# Patient Record
Sex: FEMALE | Race: WHITE | Employment: UNEMPLOYED | URBAN - METROPOLITAN AREA
[De-identification: names, ages, dates, MRNs, and addresses within clinical notes are randomized per-mention and may not be internally consistent; named-entity substitution may affect disease eponyms.]

---

## 2020-01-19 ENCOUNTER — TELEPHONE (OUTPATIENT)
Dept: OBGYN UNIT | Facility: HOSPITAL | Age: 35
End: 2020-01-19

## 2020-01-19 DIAGNOSIS — L29.9 ITCHING: Primary | ICD-10-CM

## 2020-01-19 NOTE — TELEPHONE ENCOUNTER
Patient called complaining of an itchy hand and an itchy foot. She is worried about cholestasis because she had it last pregnancy at 28 weeks and it was really bad at that time. Discussed with patient that this is unlikely but could be early warning sign.

## 2020-01-20 ENCOUNTER — TELEPHONE (OUTPATIENT)
Dept: PERINATAL CARE | Facility: HOSPITAL | Age: 35
End: 2020-01-20

## 2020-01-28 NOTE — PROGRESS NOTES
Soh[facility] Cummings Consultation    Dear Dr. Yessy Callaway,    Thank you for requesting ultrasound evaluation and maternal fetal medicine consultation on your patient Lavern Morillo.   As you are aware she is a 29year old female with a Singlet Rfl:   Allergies: No Known Allergies      PHYSICAL EXAMINATION:  /72   Pulse 91   Ht 4' 10\" (1.473 m)   Wt 148 lb (67.1 kg)   LMP 08/18/2019 (Exact Date)   BMI 30.93 kg/m²   General: alert and oriented,no acute distress  Abdomen: gravid, soft, non-t plexus, Cisterna Magna, midline falx, cerebellum, cerebellar lobes, posterior fossa, vermis, cavum septi pellucidi.   Heart: Visualized and normal appearance: cardiac location, four-chamber view, left outflow tract, right outflow tract, great vessels, Ducta Preeclampsia   * Hydramnios   * Fetal macrosomia   * Fetal organomegaly (hepatomegaly, cardiomegaly)   * Birth trauma   * Operative delivery   *  mortality   *  respiratory problems and metabolic complications   The spontaneous  ra

## 2020-02-04 ENCOUNTER — OFFICE VISIT (OUTPATIENT)
Dept: PERINATAL CARE | Facility: HOSPITAL | Age: 35
End: 2020-02-04
Attending: OBSTETRICS & GYNECOLOGY
Payer: COMMERCIAL

## 2020-02-04 VITALS
WEIGHT: 148 LBS | BODY MASS INDEX: 31.07 KG/M2 | SYSTOLIC BLOOD PRESSURE: 115 MMHG | DIASTOLIC BLOOD PRESSURE: 72 MMHG | HEART RATE: 91 BPM | HEIGHT: 58 IN

## 2020-02-04 DIAGNOSIS — Z36.9 ENCOUNTER FOR ANTENATAL SCREENING OF MOTHER: ICD-10-CM

## 2020-02-04 DIAGNOSIS — O24.410 DIET CONTROLLED GESTATIONAL DIABETES MELLITUS (GDM), ANTEPARTUM: ICD-10-CM

## 2020-02-04 DIAGNOSIS — Z87.42 HISTORY OF PCOS: ICD-10-CM

## 2020-02-04 PROBLEM — Z34.92 SECOND TRIMESTER PREGNANCY: Status: ACTIVE | Noted: 2020-02-04

## 2020-02-04 PROBLEM — Z34.92 SECOND TRIMESTER PREGNANCY (HCC): Status: ACTIVE | Noted: 2020-02-04

## 2020-02-04 PROBLEM — E03.9 HYPOTHYROIDISM: Status: ACTIVE | Noted: 2020-02-04

## 2020-02-04 PROCEDURE — 99243 OFF/OP CNSLTJ NEW/EST LOW 30: CPT | Performed by: OBSTETRICS & GYNECOLOGY

## 2020-02-04 PROCEDURE — 76811 OB US DETAILED SNGL FETUS: CPT | Performed by: OBSTETRICS & GYNECOLOGY

## 2020-03-05 NOTE — PROGRESS NOTES
Venecia Edgar Consultation    Dear Dr. Glo Butts    Thank you for requesting ultrasound evaluation and maternal fetal medicine consultation on your patient Tammy Maddox.   As you are aware she is a 29year old female  with a General: alert and oriented,no acute distress  Abdomen: gravid, soft, non-tender  Extremities: non-tender, no edema    OBSTETRIC ULTRASOUND  The patient had a follow-up growth ultrasound today which revealed normal interval fetal growth.  _______________ discussed the increased incidence of macrosomia and the potential for related birth injury to her and her baby.  We talked about the increase risks associated risk of fetal hyperinsulinemia, jaundice, electrolyte imbalance, seizure activity, IUFD and other advised for medication controlled diabetes    Thank you for allowing me to participate in the care of your patient. Please do not hesitate to contact me if additional questions or concerns arise. Collin Cespedes. Paola Gallegos M.D.     The majority of the time (>50

## 2020-03-06 ENCOUNTER — OFFICE VISIT (OUTPATIENT)
Dept: PERINATAL CARE | Facility: HOSPITAL | Age: 35
End: 2020-03-06
Attending: OBSTETRICS & GYNECOLOGY
Payer: COMMERCIAL

## 2020-03-06 VITALS
WEIGHT: 147 LBS | DIASTOLIC BLOOD PRESSURE: 77 MMHG | BODY MASS INDEX: 31 KG/M2 | HEART RATE: 92 BPM | SYSTOLIC BLOOD PRESSURE: 127 MMHG

## 2020-03-06 DIAGNOSIS — O24.419 GDM, CLASS A2: Primary | ICD-10-CM

## 2020-03-06 DIAGNOSIS — O24.414 INSULIN CONTROLLED GESTATIONAL DIABETES MELLITUS (GDM) IN SECOND TRIMESTER: Primary | ICD-10-CM

## 2020-03-06 PROCEDURE — 99242 OFF/OP CONSLTJ NEW/EST SF 20: CPT | Performed by: OBSTETRICS & GYNECOLOGY

## 2020-03-06 PROCEDURE — 76816 OB US FOLLOW-UP PER FETUS: CPT | Performed by: OBSTETRICS & GYNECOLOGY

## 2020-03-16 DIAGNOSIS — O24.419 GDM, CLASS A2: ICD-10-CM

## 2020-03-23 DIAGNOSIS — O24.419 GDM, CLASS A2: ICD-10-CM

## 2020-04-20 DIAGNOSIS — O24.419 GDM, CLASS A2: ICD-10-CM

## 2020-04-21 DIAGNOSIS — O24.419 GDM, CLASS A2: ICD-10-CM

## 2020-05-07 ENCOUNTER — TELEPHONE (OUTPATIENT)
Dept: OBGYN UNIT | Facility: HOSPITAL | Age: 35
End: 2020-05-07

## 2020-05-11 ENCOUNTER — HOSPITAL ENCOUNTER (INPATIENT)
Facility: HOSPITAL | Age: 35
LOS: 1 days | Discharge: HOME | End: 2020-05-12
Attending: OBSTETRICS & GYNECOLOGY | Admitting: OBSTETRICS & GYNECOLOGY
Payer: COMMERCIAL

## 2020-05-11 ENCOUNTER — ANESTHESIA EVENT (OUTPATIENT)
Dept: OBGYN UNIT | Facility: HOSPITAL | Age: 35
End: 2020-05-11
Payer: COMMERCIAL

## 2020-05-11 ENCOUNTER — ANESTHESIA (OUTPATIENT)
Dept: OBGYN UNIT | Facility: HOSPITAL | Age: 35
End: 2020-05-11
Payer: COMMERCIAL

## 2020-05-11 ENCOUNTER — APPOINTMENT (OUTPATIENT)
Dept: OBGYN CLINIC | Facility: HOSPITAL | Age: 35
End: 2020-05-11
Payer: COMMERCIAL

## 2020-05-11 PROBLEM — Z34.90 PREGNANCY: Status: ACTIVE | Noted: 2020-05-11

## 2020-05-11 PROBLEM — Z34.90 PREGNANCY (HCC): Status: ACTIVE | Noted: 2020-05-11

## 2020-05-11 PROCEDURE — 86850 RBC ANTIBODY SCREEN: CPT | Performed by: OBSTETRICS & GYNECOLOGY

## 2020-05-11 PROCEDURE — 3E033VJ INTRODUCTION OF OTHER HORMONE INTO PERIPHERAL VEIN, PERCUTANEOUS APPROACH: ICD-10-PCS | Performed by: OBSTETRICS & GYNECOLOGY

## 2020-05-11 PROCEDURE — 85025 COMPLETE CBC W/AUTO DIFF WBC: CPT | Performed by: OBSTETRICS & GYNECOLOGY

## 2020-05-11 PROCEDURE — 82962 GLUCOSE BLOOD TEST: CPT

## 2020-05-11 PROCEDURE — 86780 TREPONEMA PALLIDUM: CPT | Performed by: OBSTETRICS & GYNECOLOGY

## 2020-05-11 PROCEDURE — 86901 BLOOD TYPING SEROLOGIC RH(D): CPT | Performed by: OBSTETRICS & GYNECOLOGY

## 2020-05-11 PROCEDURE — 86900 BLOOD TYPING SEROLOGIC ABO: CPT | Performed by: OBSTETRICS & GYNECOLOGY

## 2020-05-11 PROCEDURE — 81002 URINALYSIS NONAUTO W/O SCOPE: CPT

## 2020-05-11 PROCEDURE — 0HQ9XZZ REPAIR PERINEUM SKIN, EXTERNAL APPROACH: ICD-10-PCS | Performed by: OBSTETRICS & GYNECOLOGY

## 2020-05-11 RX ORDER — IBUPROFEN 600 MG/1
600 TABLET ORAL EVERY 6 HOURS PRN
Status: DISCONTINUED | OUTPATIENT
Start: 2020-05-11 | End: 2020-05-11

## 2020-05-11 RX ORDER — BISACODYL 10 MG
10 SUPPOSITORY, RECTAL RECTAL ONCE AS NEEDED
Status: DISCONTINUED | OUTPATIENT
Start: 2020-05-11 | End: 2020-05-13

## 2020-05-11 RX ORDER — AMMONIA INHALANTS 0.04 G/.3ML
0.3 INHALANT RESPIRATORY (INHALATION) AS NEEDED
Status: DISCONTINUED | OUTPATIENT
Start: 2020-05-11 | End: 2020-05-11

## 2020-05-11 RX ORDER — ACETAMINOPHEN 325 MG/1
650 TABLET ORAL EVERY 6 HOURS PRN
Status: DISCONTINUED | OUTPATIENT
Start: 2020-05-11 | End: 2020-05-13

## 2020-05-11 RX ORDER — TRISODIUM CITRATE DIHYDRATE AND CITRIC ACID MONOHYDRATE 500; 334 MG/5ML; MG/5ML
30 SOLUTION ORAL AS NEEDED
Status: DISCONTINUED | OUTPATIENT
Start: 2020-05-11 | End: 2020-05-11

## 2020-05-11 RX ORDER — SODIUM CHLORIDE, SODIUM LACTATE, POTASSIUM CHLORIDE, CALCIUM CHLORIDE 600; 310; 30; 20 MG/100ML; MG/100ML; MG/100ML; MG/100ML
INJECTION, SOLUTION INTRAVENOUS CONTINUOUS
Status: DISCONTINUED | OUTPATIENT
Start: 2020-05-11 | End: 2020-05-11

## 2020-05-11 RX ORDER — DIPHENHYDRAMINE HYDROCHLORIDE 50 MG/ML
12.5 INJECTION INTRAMUSCULAR; INTRAVENOUS EVERY 4 HOURS PRN
Status: DISCONTINUED | OUTPATIENT
Start: 2020-05-11 | End: 2020-05-11

## 2020-05-11 RX ORDER — ONDANSETRON 2 MG/ML
4 INJECTION INTRAMUSCULAR; INTRAVENOUS EVERY 6 HOURS PRN
Status: DISCONTINUED | OUTPATIENT
Start: 2020-05-11 | End: 2020-05-11

## 2020-05-11 RX ORDER — DEXTROSE, SODIUM CHLORIDE, SODIUM LACTATE, POTASSIUM CHLORIDE, AND CALCIUM CHLORIDE 5; .6; .31; .03; .02 G/100ML; G/100ML; G/100ML; G/100ML; G/100ML
INJECTION, SOLUTION INTRAVENOUS CONTINUOUS
Status: DISCONTINUED | OUTPATIENT
Start: 2020-05-11 | End: 2020-05-11

## 2020-05-11 RX ORDER — ACETAMINOPHEN 500 MG
500 TABLET ORAL EVERY 6 HOURS PRN
Status: DISCONTINUED | OUTPATIENT
Start: 2020-05-11 | End: 2020-05-11

## 2020-05-11 RX ORDER — DEXTROSE MONOHYDRATE 25 G/50ML
50 INJECTION, SOLUTION INTRAVENOUS
Status: DISCONTINUED | OUTPATIENT
Start: 2020-05-11 | End: 2020-05-11

## 2020-05-11 RX ORDER — TERBUTALINE SULFATE 1 MG/ML
0.25 INJECTION, SOLUTION SUBCUTANEOUS AS NEEDED
Status: DISCONTINUED | OUTPATIENT
Start: 2020-05-11 | End: 2020-05-11

## 2020-05-11 RX ORDER — EPHEDRINE SULFATE/0.9% NACL/PF 25 MG/5 ML
5 SYRINGE (ML) INTRAVENOUS AS NEEDED
Status: DISCONTINUED | OUTPATIENT
Start: 2020-05-11 | End: 2020-05-11

## 2020-05-11 RX ORDER — SIMETHICONE 80 MG
80 TABLET,CHEWABLE ORAL 3 TIMES DAILY PRN
Status: DISCONTINUED | OUTPATIENT
Start: 2020-05-11 | End: 2020-05-13

## 2020-05-11 RX ORDER — DOCUSATE SODIUM 100 MG/1
100 CAPSULE, LIQUID FILLED ORAL
Status: DISCONTINUED | OUTPATIENT
Start: 2020-05-11 | End: 2020-05-13

## 2020-05-11 RX ORDER — IBUPROFEN 600 MG/1
600 TABLET ORAL EVERY 6 HOURS
Status: DISCONTINUED | OUTPATIENT
Start: 2020-05-11 | End: 2020-05-13

## 2020-05-11 NOTE — ANESTHESIA PREPROCEDURE EVALUATION
PRE-OP EVALUATION    Patient Name: Nasir Rodriguez    Pre-op Diagnosis: * Labor *    * Labor Epidural *    * Madonna *    Pre-op vitals reviewed. Temp: 97.7 °F (36.5 °C)  Pulse: 56  Resp: 16  BP: 135/71     Body mass index is 30.31 kg/m².     Marco A Cervantes citrate (SUBLIMAZE) 0.05 MG/ML injection 100 mcg, 100 mcg, Epidural, Once  EPHEDrine sulfate in NaCl 0.9% (PF) 25 mg/5 ml injection 5 mg, 5 mg, Intravenous, PRN  diphenhydrAMINE HCl (BENADRYL) injection 12.5 mg, 12.5 mg, Intravenous, Q4H PRN        Outpati 4.30 04/21/2020    CL 99 (L) 04/21/2020    CO2 22.1 04/21/2020    BUN 18.0 04/21/2020    CREATSERUM 0.77 04/21/2020    GLU 95 04/21/2020            Airway      Mallampati: II  Mouth opening: 3 FB  TM distance: 4 - 6 cm  Neck ROM: full Cardiovascular    Car

## 2020-05-11 NOTE — H&P
[facility] Patient Status:  Inpatient    6/10/1985 MRN [id number]   Location [facility] Attending Pham Hoff MD   Hosp Day # 0 PCP None Pcp     SUBJECTIVE:    Donnie Michael , Rfl: , 5/10/2020 at 2230  Prenatal Vit-Fe Sulfate-FA-DHA (PRENATAL+DHA OR), Take by mouth., Disp: , Rfl: , 5/10/2020 at 2230  Insulin Pen Needle 32G X 4 MM Does not apply Misc, Use QHS with Levemir pen, Disp: 1 Box, Rfl: 5, Taking      Allergies: No Know

## 2020-05-11 NOTE — PROGRESS NOTES
Pt is a 29year old female admitted to 108/108-A. Patient presents with:  Scheduled Induction: Pt is a GDM A2 on insulin, here for scheduled Induction of labor. Pt is  38w1d intra-uterine pregnancy. History obtained, consents signed.  Judd Brasher

## 2020-05-11 NOTE — PLAN OF CARE
Problem: Diabetes/Glucose Control  Goal: Glucose maintained within prescribed range  Description  INTERVENTIONS:  - Monitor Blood Glucose as ordered  - Assess for signs and symptoms of hyperglycemia and hypoglycemia  - Administer ordered medications to m techniques  - Monitor for opioid side effects  - Notify MD/LIP if interventions unsuccessful or patient reports new pain  - Anticipate increased pain with activity and pre-medicate as appropriate  Outcome: Progressing     Problem: ANXIETY  Goal: Will repor

## 2020-05-11 NOTE — PROGRESS NOTES
SVE - 2-3/80/-1; AROM with clear fluid  TOCO - contractions every 2-3 mins  FHT - 150, reactive.     CPM

## 2020-05-11 NOTE — ANESTHESIA PROCEDURE NOTES
Labor Analgesia  Performed by: Mary Downey MD  Authorized by: Mary Downey MD       General Information and Staff    Start Time:  5/11/2020 1:50 PM  End Time:  5/11/2020 2:15 PM  Anesthesiologist:  Mary Downey MD  Performed by:   Anesthe

## 2020-05-12 VITALS
WEIGHT: 145 LBS | RESPIRATION RATE: 18 BRPM | TEMPERATURE: 98 F | HEIGHT: 58 IN | HEART RATE: 59 BPM | SYSTOLIC BLOOD PRESSURE: 137 MMHG | DIASTOLIC BLOOD PRESSURE: 82 MMHG | BODY MASS INDEX: 30.44 KG/M2

## 2020-05-12 PROBLEM — Z34.90 PREGNANCY (HCC): Status: RESOLVED | Noted: 2020-05-11 | Resolved: 2020-05-12

## 2020-05-12 PROBLEM — Z34.90 PREGNANCY: Status: RESOLVED | Noted: 2020-05-11 | Resolved: 2020-05-12

## 2020-05-12 PROCEDURE — 85025 COMPLETE CBC W/AUTO DIFF WBC: CPT | Performed by: OBSTETRICS & GYNECOLOGY

## 2020-05-12 RX ORDER — IBUPROFEN 600 MG/1
600 TABLET ORAL EVERY 6 HOURS
Qty: 60 TABLET | Refills: 0 | Status: SHIPPED | OUTPATIENT
Start: 2020-05-12 | End: 2020-06-19 | Stop reason: ALTCHOICE

## 2020-05-12 NOTE — PROGRESS NOTES
OB Progress Note PPD#1  S: Feels well. Ambulating, eating. Pain controlled. Minimal VB. Breastfeeding/pumping. O:   Blood pressure 128/72, pulse 57, temperature 97.8 °F (36.6 °C), temperature source Oral, resp.  rate 16, height 4' 10\" (1.473 m), weight

## 2020-05-12 NOTE — PROGRESS NOTES
Patient admitted via wheelchair to room. ID bands cross matched with L&D rn. Oriented to room. Safety precautions initiated. Bed in low position Call light in reach. D/C teachings initiated bedside.  Plan of care discussed Pt told to call for asst when amos

## 2020-05-12 NOTE — L&D DELIVERY NOTE
Vaginal Delivery Note          Heath Bassett Patient Status:  Inpatient    6/10/1985 MRN [id number]   Location [facility] [facility] Attending Abdifatah Quezada MD   [facility] Day

## 2020-05-12 NOTE — PROGRESS NOTES
Report called and given to Sindy Hartmann RN. Patient transferred in stable condition to Mother [facility]. ID bands verified.

## 2020-05-12 NOTE — PLAN OF CARE
Problem: Diabetes/Glucose Control  Goal: Glucose maintained within prescribed range  Description  INTERVENTIONS:  - Monitor Blood Glucose as ordered  - Assess for signs and symptoms of hyperglycemia and hypoglycemia  - Administer ordered medications to m techniques  - Monitor for opioid side effects  - Notify MD/LIP if interventions unsuccessful or patient reports new pain  - Anticipate increased pain with activity and pre-medicate as appropriate  Outcome: Progressing     Problem: ANXIETY  Goal: Will repor fingers/hand) versus late cue of crying.  - Discuss/demonstrate breast feeding aids (e.g., infant sling, nursing footstool/pillows, and breast pumps). - Encourage mother/other family members to express feelings/concerns, and actively listen.   - Educate fa

## 2020-05-13 NOTE — PLAN OF CARE
Problem: Diabetes/Glucose Control  Goal: Glucose maintained within prescribed range  Description  INTERVENTIONS:  - Monitor Blood Glucose as ordered  - Assess for signs and symptoms of hyperglycemia and hypoglycemia  - Administer ordered medications to m techniques  - Monitor for opioid side effects  - Notify MD/LIP if interventions unsuccessful or patient reports new pain  - Anticipate increased pain with activity and pre-medicate as appropriate  Outcome: Completed     Problem: ANXIETY  Goal: Will report fingers/hand) versus late cue of crying.  - Discuss/demonstrate breast feeding aids (e.g., infant sling, nursing footstool/pillows, and breast pumps). - Encourage mother/other family members to express feelings/concerns, and actively listen.   - Educate fa

## 2020-05-13 NOTE — PROGRESS NOTES
Patient discharged home in stable condition, discharge papers signed, security bands verified and removed,and education completed. All questions answered and pt verbalized understanding when to call the doctor.

## 2020-05-13 NOTE — PROGRESS NOTES
Notified Dr. Smith Kingsley of higher blood pressure when taken tonight. She advised patient to call the office tomorrow and schedule an appointment for Friday 5/15 to have her blood pressure checked. This information was noted in the discharge papers.

## 2020-05-15 ENCOUNTER — TELEPHONE (OUTPATIENT)
Dept: OBGYN UNIT | Facility: HOSPITAL | Age: 35
End: 2020-05-15

## 2020-05-15 NOTE — PROGRESS NOTES
Labor Analgesia Follow Up Note    Patient underwent epidural anesthesia for labor analgesia,    Placenta Date/Time: 5/11/2020  6:48 PM    Delivery Date/Time[de-identified] 5/11/2020  6:46 PM    /82 (BP Location: Left arm)   Pulse 59   Temp 98 °F (36.7 °C) (Oral)

## 2020-05-20 PROBLEM — O24.410 DIET CONTROLLED GESTATIONAL DIABETES MELLITUS (GDM), ANTEPARTUM: Status: RESOLVED | Noted: 2020-02-04 | Resolved: 2020-05-11

## 2020-05-20 PROBLEM — O24.410 DIET CONTROLLED GESTATIONAL DIABETES MELLITUS (GDM), ANTEPARTUM (HCC): Status: RESOLVED | Noted: 2020-02-04 | Resolved: 2020-05-11

## 2021-05-13 ENCOUNTER — LAB REQUISITION (OUTPATIENT)
Dept: LAB | Facility: HOSPITAL | Age: 36
End: 2021-05-13
Payer: COMMERCIAL

## 2021-05-13 DIAGNOSIS — K81.9 CHOLECYSTITIS, UNSPECIFIED: ICD-10-CM

## 2021-05-13 PROCEDURE — 88304 TISSUE EXAM BY PATHOLOGIST: CPT | Performed by: SURGERY

## 2024-11-14 ENCOUNTER — APPOINTMENT (OUTPATIENT)
Dept: OTHER | Facility: HOSPITAL | Age: 39
End: 2024-11-14
Attending: PREVENTIVE MEDICINE

## (undated) NOTE — LETTER
Dear new mom:    We've missed you! The nurses of [facility] have tried to reach you by phone to ask if you had any questions regarding your health or the care of your new little one.     Please feel free to call your doctor with an